# Patient Record
(demographics unavailable — no encounter records)

---

## 2024-10-25 NOTE — PHYSICAL EXAM
[Soft] : abdomen soft [Non Tender] : non-tender [Non-distended] : non-distended [Grossly Normal Strength/Tone] : grossly normal strength/tone [No Rash] : no rash [Normal] : affect was normal and insight and judgment were intact [de-identified] : +1 edema b/l LE

## 2024-10-25 NOTE — HISTORY OF PRESENT ILLNESS
[FreeTextEntry1] : CPE [de-identified] : Pt is a 50M with PMH of HTN, HLD, pre-DM, OA, obesity, PERLA, and left Achilles tendon rupture (Aug 2022) presenting for CPE. Pt reports 4 days of red blood in toilet after BM (twice a day) bright red blood on toilet paper and streaks of blood on stool, Stool color normal, not dark black. Pt denies pain with BM, constipation or diarrhea. However, pt reports BM today without blood. Pt reports having flu 2 weeks ago, now feels better. Pt has been keeping BP log over the last week. Home BP 130s/80s for last 4 days taken once at night.

## 2024-10-25 NOTE — ASSESSMENT
[FreeTextEntry1] : Pt is a 50M with PMH of HTN, HLD, pre-DM, OA, obesity, PERLA, and left Achilles tendon rupture (Aug 2022) presenting for CPE.. Pt reports having flu 2 weeks ago, now feels better.  #BRBPR Pt with C-scope in 2023 showing external hemorrhoids, diverticulosis and 4mm polyp (removed). Now pt reports 4 days of red blood in toilet after BM (twice a day) bright red blood on toilet paper and streaks of blood on stool, Stool color normal, not dark black. Pt denies pain with BM, constipation or diarrhea. However, pt reports BM today without blood. Pt also reports recent TOMAS. On exam LACI negative, noted external hemorrhoids with no active bleeding. Given recent C-scope DDx include hemorrhoids vs diverticulosis. Will Obtain CBC today. if significant H/H drop would consider blood loss as cause of TOMAS. If no significant H/H drop will work up other causes of TOMAS as below. Regardless, on exam pt with no s/s of active bleed. GI referral sent for f/u. and pt to RTC in 1 week for monitoring progression vs resolution  - f/u CBC (consider Iron supplement pending CBC)  - f/u GI  - RTC 1 week - c/w Omeprazole 20mg qd   #TOMAS Pt reports recent TOMAS. Pt can walk 3 flights of steps but feels "winded" afterwards. This is a change from baseline per pt. Pt with TTE in 2023 with EF 61%. ASCVD 10% with CAD risk factors. DDx include blood loss vs cardiac. EKG in office today unchanged from last year (Non ischemic with 1st degree AV block) On exam pt with chronic LE edema. Will obtain CBC today and refer for stress testing for further evaluation. If CBC shows significant drop in H/H can consider holding off stress testing as likely cause in that case would be anemia and plan as above.  - f/u CBC  - f/u Stress testing (pending CBC as above)    #HTN  Pt with HTN on HCTZ 12.5mg qd, Losartan 100mg qd.  Pt has been keeping BP log over the last week. Home BP 130s/80s for last 4 days taken once at night. Will continue with current regimen for now  - c/w HCTZ 12.5mg qd - c/w Losartan 100mg qd  #PreDM A1c 6.2 on 7/23. Will repeat today and discuss metformin as a possible option which may help with weight loss and preDM.  - f/u A1c   #HLD Pt with  in 7/23. ASCVD 10% and PreDM. Pt initially hesitant to starting statin therapy. Discussion regarding benefits, risk and alternatives with pt. Pt decided to start Atorvastatin 20mg qhs  - start Atorvastatin 20mg qhs   #HCM  - Deferred flu vaccine today

## 2024-10-25 NOTE — PHYSICAL EXAM
[Soft] : abdomen soft [Non Tender] : non-tender [Non-distended] : non-distended [Grossly Normal Strength/Tone] : grossly normal strength/tone [No Rash] : no rash [Normal] : affect was normal and insight and judgment were intact [de-identified] : +1 edema b/l LE

## 2024-10-25 NOTE — HISTORY OF PRESENT ILLNESS
[FreeTextEntry1] : CPE [de-identified] : Pt is a 50M with PMH of HTN, HLD, pre-DM, OA, obesity, PERLA, and left Achilles tendon rupture (Aug 2022) presenting for CPE. Pt reports 4 days of red blood in toilet after BM (twice a day) bright red blood on toilet paper and streaks of blood on stool, Stool color normal, not dark black. Pt denies pain with BM, constipation or diarrhea. However, pt reports BM today without blood. Pt reports having flu 2 weeks ago, now feels better. Pt has been keeping BP log over the last week. Home BP 130s/80s for last 4 days taken once at night.

## 2024-10-25 NOTE — HEALTH RISK ASSESSMENT
[Fair] :  ~his/her~ mood as fair [Yes] : Yes [Monthly or less (1 pt)] : Monthly or less (1 point) [1 or 2 (0 pts)] : 1 or 2 (0 points) [No falls in past year] : Patient reported no falls in the past year [0] : 2) Feeling down, depressed, or hopeless: Not at all (0) [Never] : Never [NO] : No [With Family] : lives with family [Feels Safe at Home] : Feels safe at home [Fully functional (bathing, dressing, toileting, transferring, walking, feeding)] : Fully functional (bathing, dressing, toileting, transferring, walking, feeding) [Fully functional (using the telephone, shopping, preparing meals, housekeeping, doing laundry, using] : Fully functional and needs no help or supervision to perform IADLs (using the telephone, shopping, preparing meals, housekeeping, doing laundry, using transportation, managing medications and managing finances) [ColonoscopyDate] : 09/2023 [ColonoscopyComments] : 4mm polyp (removed), Diverticulosis, External hemorrhoids  [HIVDate] : 2/21 [HIVComments] : Neg [HepatitisCDate] : 8/23 [HepatitisCComments] : Neg

## 2024-10-25 NOTE — REVIEW OF SYSTEMS
[Lower Ext Edema] : lower extremity edema [Dyspnea on Exertion] : dyspnea on exertion [Negative] : Heme/Lymph [Chest Pain] : no chest pain [Palpitations] : no palpitations [Shortness Of Breath] : no shortness of breath [Abdominal Pain] : no abdominal pain [Nausea] : no nausea [Constipation] : no constipation [Diarrhea] : no diarrhea [Vomiting] : no vomiting [Heartburn] : no heartburn [Melena] : no melena [FreeTextEntry8] : Breast tenderness

## 2024-10-31 NOTE — HISTORY OF PRESENT ILLNESS
[FreeTextEntry1] : Ms. Adams is a 50-year-old male with past medical history of HTN, HLD, PreDM, OA, PERLA and reflux who previously presented with BRBPR since 2014 now s/p colonoscopy in 9/23. Today he presents for follow up. Since last appt and colonoscopy he reports he is still having rectal bleeding. He saw his PCP recently who he discussed it with and they sent him here. Bleeding comes and goes, it is mixed with the stool. Recently he has noticed it more. It is when he wipes and mixed in. Denies rectal pain, pruritis. He has known history of internal and external hemorrhoids. He does report that he can tell when it is about to happen, he gets a warm feeling before an episode. Never seen colorectal surg before for them. Denies abdominal pain, nausea, vomiting, diarrhea, constipation. Reports he has about 1-2BM a day mixed, not hard. Denies incomplete emptying or straining. Denies postprandial pain. He reports ongoing history of acid reflux, he is currently taking 20 of omeprazole daily with relief. Denies OTC use of medications. Denies prior history of stomach ulcers, H.Pylori infection, acid reflux, esophagitis, gastritis, diverticulosis/diverticulitis, concerning polyps, hemorrhoids, IBS, IBD, Celiac Disease, EPI, SIBO. Reports ongoing history of lactose intolerance. Denies oral lesions, joint pain, new rashes.   Diet- egg, cheese, steak, rice, beans, pizza, potato chips   PMHx- HTN, HLD, pre-DM, OA, obesity, PERLA, PSHx- Right knee surgery, cystoscopy  Rx- HTN meds Supplements/herbs/OTC- none A/c or NSAIDs? none FHx- no GI cancers, adopted Allergies- PCN  Social History: ETOH- occasional, not even weekly Smoking- none Drugs- none  Breath tests- h. 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   EGD - never Colonoscopy - 9/5/23 Diverticulosis of the sigmoid colon, external hemorrhoids, polyp 4mm in the sigmoid. PATH: Hyperplastic polyp  [de-identified] : denies

## 2024-10-31 NOTE — REVIEW OF SYSTEMS
[Lower Ext Edema] : lower extremity edema [Dyspnea on Exertion] : dyspnea on exertion [Fever] : no fever [Chills] : no chills [Fatigue] : no fatigue [Recent Change In Weight] : ~T no recent weight change [Vision Problems] : no vision problems [Chest Pain] : no chest pain [Palpitations] : no palpitations [Orthopena] : no orthopnea [Shortness Of Breath] : no shortness of breath [Abdominal Pain] : no abdominal pain [Vomiting] : no vomiting [Dysuria] : no dysuria [Hematuria] : no hematuria [Joint Pain] : no joint pain [Back Pain] : no back pain [Headache] : no headache [Dizziness] : no dizziness [Fainting] : no fainting [Memory Loss] : no memory loss [FreeTextEntry7] : hematochezia

## 2024-10-31 NOTE — PHYSICAL EXAM
[Alert] : alert [Healthy Appearing] : healthy appearing [Sclera] : the sclera and conjunctiva were normal [Hearing Threshold Finger Rub Not [place]] : hearing was normal [Oropharynx] : the oropharynx was normal [Normal Appearance] : the appearance of the neck was normal [No Respiratory Distress] : no respiratory distress [Heart Rate And Rhythm] : heart rate was normal and rhythm regular [None] : no edema [Abdomen Tenderness] : non-tender [No Masses] : no abdominal mass palpated [Abdomen Soft] : soft [Abnormal Walk] : normal gait [Normal Color / Pigmentation] : normal skin color and pigmentation [] : no rash [No Focal Deficits] : no focal deficits [Oriented To Time, Place, And Person] : oriented to person, place, and time [Normal Affect] : the affect was normal [Ascites: ___] : no ascites [Rebound Tenderness] : no rebound tenderness

## 2024-10-31 NOTE — ASSESSMENT
[FreeTextEntry1] : 50-year-old male with past medical history of HTN, HLD, PreDM, OA, PERLA and reflux who previously presented with BRBPR since 2014 now s/p colonoscopy in 9/23. Today he presents for follow up for his BRBPR   #BRBPR #Hemorroids -Differential for BRBPR includes hemorrhoids vs IBD vs diverticular bleed -Given symptoms and overall presentation, suspect 2/2 hemorrhoids -Discussed prevention and constipation prevention -Discussed lifestyle modifications including water intake, diet, and exercise -Discussed diet and fiber intake and otc fiber supplementation including: Psyllium husk, Methylcellulose, Metamucil, Citrucel - titrate up to 2x per day -Bowel hygiene, avoid sitting for long period time or straining -If no improvement, can consider sending to colorectal surg for banding or other interventions -FU in 1-2 months   #GERD -continue omeprazole -can trial off if symptoms improved  #Colon Cancer screening #Diverticulosis #Sigmoid colon, hyperplastic polyp -Last done 9/5/23 -Repeat screening 9/2030  Patient seen and discussed with GI Attending Dr. Brenda Lazo DO, PhD Gastroenterology Fellow [facility]

## 2024-10-31 NOTE — HISTORY OF PRESENT ILLNESS
[FreeTextEntry1] : follow up [de-identified] : 51 y/o M pmhx HTN, HLD, pre-diabetes, OA, obesity, PERLA who presents for 1 week follow up of BRBPR. Patient has had this going on for over a year and had colonoscopy 9/2023.which showed mild diverticulosis and external hemorrhoids. Denies worsening of symptoms since being seen in office last week and has appointment to see GI later this afternoon. Besides this, patient reports ongoing dyspnea on exertion which he has been having for some time. Was sent Rx for outpatient stress test which he has not checked his mailbox for, however, plans on having it done once he can get the referral.  In addition, patient is aware he now has diabetes with A1c 6.7%. Reports that he had diagnosis of diabetes in the past and tried metformin for 3 days with adverse effects, and instead lost weight to bring his A1c down. He states that he would like to try this again before using any antiglycemic agents.  Finally, patient has ongoing bilateral breast pain. States that this has been going on for a long time, has had negative bilateral mammograms and stopped spironolactone many months ago, however, has continued breast pain. Has tried voltaren gel with some relief.  Besides this, and BRBPR, denies any complaints including chest pain, lightheadedness, SOB at rest.

## 2024-10-31 NOTE — ASSESSMENT
[FreeTextEntry1] : #BG monitoring with glucometer #GI appointment #lifestyle changes, weight loss #follow up 1 month

## 2024-10-31 NOTE — END OF VISIT
[] : Fellow [FreeTextEntry3] : 50M w/ obesity, metabolic syndrome, PERLA. Here as FU for BRBPR. Sx ongoing for >1y and stable. Had COL 2023 for Sx that indicated w/ good prep, diverticulosis, hemorrhoids and 1 polyp = HP. Sx similar to prior w/o sig change. No wt loss or new Sx. Endorses sitting long times on toilet and lifting heavy (helps w/ mother's care). Likely hemorrhoidal bleed. Will manage conservatively: trial fiber, counseled on decreased toilet time and proper perianal hygiene, will try to avoid straining.

## 2024-10-31 NOTE — PHYSICAL EXAM
[No Acute Distress] : no acute distress [Well-Appearing] : well-appearing [PERRL] : pupils equal round and reactive to light [EOMI] : extraocular movements intact [No JVD] : no jugular venous distention [No Respiratory Distress] : no respiratory distress  [Normal Rate] : normal rate  [Regular Rhythm] : with a regular rhythm [Normal S1, S2] : normal S1 and S2 [No Murmur] : no murmur heard [No Carotid Bruits] : no carotid bruits [Soft] : abdomen soft [Non Tender] : non-tender [No Joint Swelling] : no joint swelling [No Rash] : no rash [Coordination Grossly Intact] : coordination grossly intact [Normal Insight/Judgement] : insight and judgment were intact [de-identified] : obese [de-identified] : lower extremity edema bilaterally [de-identified] : distended abdomen